# Patient Record
Sex: FEMALE | Race: BLACK OR AFRICAN AMERICAN
[De-identification: names, ages, dates, MRNs, and addresses within clinical notes are randomized per-mention and may not be internally consistent; named-entity substitution may affect disease eponyms.]

---

## 2017-06-30 ENCOUNTER — HOSPITAL ENCOUNTER (OUTPATIENT)
Dept: HOSPITAL 23 - CCAT | Age: 57
Discharge: HOME | End: 2017-06-30
Attending: INTERNAL MEDICINE
Payer: MEDICAID

## 2017-06-30 DIAGNOSIS — R10.9: ICD-10-CM

## 2017-06-30 DIAGNOSIS — K57.30: ICD-10-CM

## 2017-06-30 DIAGNOSIS — R11.10: ICD-10-CM

## 2017-06-30 DIAGNOSIS — R10.13: Primary | ICD-10-CM

## 2017-07-20 ENCOUNTER — HOSPITAL ENCOUNTER (OUTPATIENT)
Dept: HOSPITAL 23 - CWCC | Age: 57
Discharge: HOME | End: 2017-07-20
Attending: INTERNAL MEDICINE
Payer: MEDICAID

## 2017-07-20 DIAGNOSIS — Z80.3: ICD-10-CM

## 2017-07-20 DIAGNOSIS — Z12.31: Primary | ICD-10-CM

## 2017-07-20 PROCEDURE — G0202 SCR MAMMO BI INCL CAD: HCPCS

## 2022-04-05 ENCOUNTER — OFFICE VISIT (OUTPATIENT)
Dept: CARDIOLOGY | Facility: CLINIC | Age: 62
End: 2022-04-05

## 2022-04-05 VITALS
HEART RATE: 82 BPM | SYSTOLIC BLOOD PRESSURE: 130 MMHG | WEIGHT: 249 LBS | BODY MASS INDEX: 47.01 KG/M2 | HEIGHT: 61 IN | DIASTOLIC BLOOD PRESSURE: 80 MMHG

## 2022-04-05 DIAGNOSIS — R06.02 SHORTNESS OF BREATH: ICD-10-CM

## 2022-04-05 DIAGNOSIS — I10 PRIMARY HYPERTENSION: ICD-10-CM

## 2022-04-05 DIAGNOSIS — R94.31 ABNORMAL EKG: Primary | ICD-10-CM

## 2022-04-05 DIAGNOSIS — I73.9 PVD (PERIPHERAL VASCULAR DISEASE) WITH CLAUDICATION: ICD-10-CM

## 2022-04-05 PROCEDURE — 99203 OFFICE O/P NEW LOW 30 MIN: CPT | Performed by: INTERNAL MEDICINE

## 2022-04-05 PROCEDURE — 93000 ELECTROCARDIOGRAM COMPLETE: CPT | Performed by: INTERNAL MEDICINE

## 2022-04-05 RX ORDER — TERBINAFINE HYDROCHLORIDE 250 MG/1
TABLET ORAL
COMMUNITY
Start: 2022-02-23

## 2022-04-05 RX ORDER — LOSARTAN POTASSIUM 50 MG/1
TABLET ORAL
COMMUNITY
Start: 2022-02-12

## 2022-04-05 RX ORDER — LOSARTAN POTASSIUM 100 MG/1
TABLET ORAL
COMMUNITY
Start: 2022-02-23

## 2022-04-05 RX ORDER — DICLOFENAC SODIUM 75 MG/1
TABLET, DELAYED RELEASE ORAL
COMMUNITY
Start: 2022-03-25

## 2022-04-05 RX ORDER — AMLODIPINE BESYLATE 5 MG/1
TABLET ORAL
COMMUNITY
Start: 2022-02-23

## 2022-04-05 NOTE — PROGRESS NOTES
NEW PATIENT   REF BY DR. GILSON ARENAS   CHEST PAIN   ABNORMAL EKG    Subjective:        [facility]  Cardiology Consult Note    Patient Identification:  Name: Heidi Cole  Age: 61 y.o.  Sex: female  :  1960  MRN: [id number]             CC  Sob  Abnormal ekg  Pre diabetic  F/h  htn   claudication     History of Present Illness:   61-year-old female here for the cardiac evaluation as well as establishment of the care with known history of benign essential arterial hypertension, hypertension also complaining of claudications with exercise has been found to have abnormal EKG, patient also complaining of significant shortness of breath on exertion    Comorbid cardiac risk factors:     Past Medical History:  Past Medical History:   Diagnosis Date   • Hyperlipidemia      Past Surgical History:  Past Surgical History:   Procedure Laterality Date   • EYE SURGERY     • KNEE SURGERY     • NECK SURGERY        Allergies:  Allergies   Allergen Reactions   • Codeine Shortness Of Breath     Home Meds:  (Not in a hospital admission)    Current Meds:   [unfilled]  Social History:   Social History     Tobacco Use   • Smoking status: Never Smoker   • Smokeless tobacco: Never Used   Substance Use Topics   • Alcohol use: Never      Family History:  History reviewed. No pertinent family history.     Review of Systems    Constitutional: No weakness,fatigue, fever, rigors, chills   Eyes: No vision changes, eye pain   ENT/oropharynx: No difficulty swallowing, sore throat, epistaxis, changes in hearing   Cardiovascular: No chest pain, chest tightness, palpitations, paroxysmal nocturnal dyspnea, orthopnea, diaphoresis, dizziness / syncopal episode   Respiratory:  Mild shortness of breath, dyspnea on exertion, cough, wheezing hemoptysis   Gastrointestinal: No abdominal pain, nausea, vomiting, diarrhea, bloody stools   Genitourinary: No hematuria, dysuria   Neurological: No headache, tremors, numbness,  one-sided weakness     Musculoskeletal: No cramps, myalgias,  joint pain, joint swelling   Integument: No rash, edema           Constitutional:  Heart Rate:  [82] 82  BP: (130)/(80) 130/80    Physical Exam   General:  Appears in no acute distress  Eyes: PERTL,  HEENT:  No JVD. Thyroid not visibly enlarged. No mucosal pallor or cyanosis  Respiratory: Respirations regular and unlabored at rest. BBS with good air entry in all fields. No crackles, rubs or wheezes auscultated  Cardiovascular: S1S2 Regular rate and rhythm. No murmur, rub or gallop auscultated. No carotid bruits. DP/PT pulses    . No pretibial pitting edema  Gastrointestinal: Abdomen soft, flat, non tender. Bowel sounds present. No hepatosplenomegaly. No ascites  Musculoskeletal: ABDALLA x4. No abnormal movements  Extremities: No digital clubbing or cyanosis  Skin: Color pink. Skin warm and dry to touch. No rashes  No xanthoma  Neuro: AAO x3 CN II-XII grossly intact            ECG 12 Lead    Date/Time: 4/5/2022 3:51 PM  Performed by: Karolyn Gardner MD  Authorized by: Karolyn Gardner MD   Comparison: not compared with previous ECG   Previous ECG: no previous ECG available  Rhythm: sinus rhythm  Q waves: II and III      Clinical impression: abnormal EKG                Cardiographics  ECG:     Telemetry:    Echocardiogram:     Imaging  Chest X-ray:     Lab Review               @[email or web address]@              Assessment:/ Recommendations / Plan:   Patient Active Problem List   Diagnosis   • Abnormal EKG   • Shortness of breath   • Primary hypertension   • PVD (peripheral vascular disease) with claudication (HCC)                    ICD-10-CM ICD-9-CM   1. Abnormal EKG  R94.31 794.31   2. Shortness of breath  R06.02 786.05   3. Primary hypertension  I10 401.9   4. PVD (peripheral vascular disease) with claudication (HCC)  I73.9 443.9     1. Abnormal EKG  Considering the patient's symptoms as well as clinical situation and  EKG findings, along with cardiac risk factors,  ischemic workup is necessary to rule out ischemic cardiomyopathy, stress induced arrhythmias, and functional capacity for diagnosis as well as prognostic consideration      2. Shortness of breath  Considering patient's medical condition as well as the risk factors, patient will require echocardiogram for further evaluation for the LV function, four-chamber evaluation, including the pressures, valvular function and  pericardial disease and pericardial effusion      3. Primary hypertension  Continue current treatment    4. PVD (peripheral vascular disease) with claudication (HCC)  Get ultrasound     Echo, walking katie    Arterial dopplar both legss/tests ordered for am      Karolyn Gardner MD  4/5/2022, 15:51 EDT      EMR Dragon/Transcription:   Dictated utilizing Dragon dictation

## 2022-04-06 PROBLEM — R94.31 ABNORMAL EKG: Status: ACTIVE | Noted: 2022-04-06

## 2022-04-06 PROBLEM — I73.9 PVD (PERIPHERAL VASCULAR DISEASE) WITH CLAUDICATION: Status: ACTIVE | Noted: 2022-04-06

## 2022-04-06 PROBLEM — I10 PRIMARY HYPERTENSION: Status: ACTIVE | Noted: 2022-04-06

## 2022-04-06 PROBLEM — R06.02 SHORTNESS OF BREATH: Status: ACTIVE | Noted: 2022-04-06

## 2022-04-08 DIAGNOSIS — I73.9 PVD (PERIPHERAL VASCULAR DISEASE) WITH CLAUDICATION: Primary | ICD-10-CM

## 2022-04-08 DIAGNOSIS — R60.1 GENERALIZED EDEMA: ICD-10-CM
